# Patient Record
Sex: FEMALE | Race: WHITE | NOT HISPANIC OR LATINO | URBAN - METROPOLITAN AREA
[De-identification: names, ages, dates, MRNs, and addresses within clinical notes are randomized per-mention and may not be internally consistent; named-entity substitution may affect disease eponyms.]

---

## 2024-11-21 ENCOUNTER — OFFICE VISIT (OUTPATIENT)
Age: 19
End: 2024-11-21
Payer: COMMERCIAL

## 2024-11-21 VITALS
BODY MASS INDEX: 19.46 KG/M2 | HEIGHT: 68 IN | DIASTOLIC BLOOD PRESSURE: 62 MMHG | WEIGHT: 128.4 LBS | SYSTOLIC BLOOD PRESSURE: 128 MMHG

## 2024-11-21 DIAGNOSIS — Z01.419 ENCOUNTER FOR ANNUAL ROUTINE GYNECOLOGICAL EXAMINATION: Primary | ICD-10-CM

## 2024-11-21 DIAGNOSIS — Z11.3 SCREENING FOR STD (SEXUALLY TRANSMITTED DISEASE): ICD-10-CM

## 2024-11-21 PROBLEM — J45.30 MILD PERSISTENT ASTHMA WITHOUT COMPLICATION: Status: ACTIVE | Noted: 2017-11-06

## 2024-11-21 PROCEDURE — 87591 N.GONORRHOEAE DNA AMP PROB: CPT | Performed by: OBSTETRICS & GYNECOLOGY

## 2024-11-21 PROCEDURE — 99385 PREV VISIT NEW AGE 18-39: CPT | Performed by: OBSTETRICS & GYNECOLOGY

## 2024-11-21 PROCEDURE — 87491 CHLMYD TRACH DNA AMP PROBE: CPT | Performed by: OBSTETRICS & GYNECOLOGY

## 2024-11-21 NOTE — PROGRESS NOTES
"Assessment/Plan:    1. Encounter for annual routine gynecological examination (Primary)      2. Screening for STD (sexually transmitted disease)    - Chlamydia/GC amplified DNA by PCR    Paragard pamphlet given  Discussed menstrual insertion and 2 week abstinence period      Subjective      Maile Samaniego is a 18 y.o. female who presents for annual exam. Periods are regular.  She denies any breast, urinary or sexual concerns.  Considering a Paragard IUD - did not do well with hormones in the past.      Current contraception: condoms and rhythm method  History of abnormal Pap smear: no  Regular self breast exam: yes  History of abnormal mammogram: no  History of abnormal lipids: no    Menstrual History:  Nulligravida  Menarche age: 9  Patient's last menstrual period was 10/25/2024 (exact date).  Period Cycle (Days):  (32-39)  Period Duration (Days): 7  Period Pattern: Regular  Menstrual Flow: Moderate, Heavy (Heavy x3-4 days)  Menstrual Control:  (disc)  Dysmenorrhea: (!) Severe  Dysmenorrhea Symptoms: Cramping, Other (Comment) (lower back pain)    Past Medical History:   Diagnosis Date    Depression     Migraine        Family History   Problem Relation Age of Onset    Miscarriages / Stillbirths Mother         one in between myself and sister    Thyroid disease Mother         postpartum years    Hypertension Maternal Grandmother     Hypertension Paternal Grandmother     Migraines Paternal Grandmother        The following portions of the patient's history were reviewed and updated as appropriate: allergies, current medications, past family history, past medical history, past social history, past surgical history, and problem list.    Review of Systems  Pertinent items are noted in HPI.      Objective      /62 (BP Location: Right arm, Patient Position: Sitting, Cuff Size: Standard)   Ht 5' 8\" (1.727 m)   Wt 58.2 kg (128 lb 6.4 oz)   LMP 10/25/2024 (Exact Date)   BMI 19.52 kg/m²     General:   alert and " oriented, in no acute distress   Heart:  Breasts: regular rate and rhythm  appear normal, no suspicious masses, no skin or nipple changes or axillary nodes.   Lungs: Effort normal   Abdomen: soft, non-tender, without masses or organomegaly   Vulva: normal   Vagina: normal mucosa   Cervix: no lesions   Uterus: normal size, mobile, non-tender   Adnexa:  Bladder: normal adnexa and no mass, fullness, tenderness  Non-tender

## 2024-11-23 LAB
C TRACH DNA SPEC QL NAA+PROBE: NEGATIVE
N GONORRHOEA DNA SPEC QL NAA+PROBE: NEGATIVE

## 2024-11-25 ENCOUNTER — RESULTS FOLLOW-UP (OUTPATIENT)
Age: 19
End: 2024-11-25